# Patient Record
Sex: MALE | Race: WHITE | URBAN - METROPOLITAN AREA
[De-identification: names, ages, dates, MRNs, and addresses within clinical notes are randomized per-mention and may not be internally consistent; named-entity substitution may affect disease eponyms.]

---

## 2017-04-14 ENCOUNTER — TRANSFERRED RECORDS (OUTPATIENT)
Dept: HEALTH INFORMATION MANAGEMENT | Facility: CLINIC | Age: 32
End: 2017-04-14

## 2017-11-13 ENCOUNTER — OFFICE VISIT (OUTPATIENT)
Dept: FAMILY MEDICINE | Facility: CLINIC | Age: 32
End: 2017-11-13

## 2017-11-13 VITALS
TEMPERATURE: 98.7 F | WEIGHT: 172.6 LBS | BODY MASS INDEX: 27.09 KG/M2 | HEIGHT: 67 IN | SYSTOLIC BLOOD PRESSURE: 120 MMHG | HEART RATE: 72 BPM | DIASTOLIC BLOOD PRESSURE: 70 MMHG

## 2017-11-13 DIAGNOSIS — G47.9 SLEEP DISTURBANCE: Primary | ICD-10-CM

## 2017-11-13 DIAGNOSIS — F32.A DEPRESSION, UNSPECIFIED DEPRESSION TYPE: ICD-10-CM

## 2017-11-13 DIAGNOSIS — F69 BEHAVIOR CONCERN IN ADULT: ICD-10-CM

## 2017-11-13 LAB
ALBUMIN SERPL-MCNC: 4.4 G/DL (ref 3.4–5)
ALP SERPL-CCNC: 85 U/L (ref 40–150)
ALT SERPL W P-5'-P-CCNC: 120 U/L (ref 0–70)
ANION GAP SERPL CALCULATED.3IONS-SCNC: 9 MMOL/L (ref 3–14)
AST SERPL W P-5'-P-CCNC: 219 U/L (ref 0–45)
BASOPHILS # BLD AUTO: 0 10E9/L (ref 0–0.2)
BASOPHILS NFR BLD AUTO: 0.3 %
BILIRUB SERPL-MCNC: 0.3 MG/DL (ref 0.2–1.3)
BUN SERPL-MCNC: 15 MG/DL (ref 7–30)
CALCIUM SERPL-MCNC: 9.3 MG/DL (ref 8.5–10.1)
CHLORIDE SERPL-SCNC: 101 MMOL/L (ref 94–109)
CO2 SERPL-SCNC: 25 MMOL/L (ref 20–32)
CREAT SERPL-MCNC: 0.88 MG/DL (ref 0.66–1.25)
DIFFERENTIAL METHOD BLD: NORMAL
EOSINOPHIL # BLD AUTO: 0.3 10E9/L (ref 0–0.7)
EOSINOPHIL NFR BLD AUTO: 3.5 %
ERYTHROCYTE [DISTWIDTH] IN BLOOD BY AUTOMATED COUNT: 12.9 % (ref 10–15)
FOLATE SERPL-MCNC: 25.9 NG/ML
GFR SERPL CREATININE-BSD FRML MDRD: >90 ML/MIN/1.7M2
GLUCOSE SERPL-MCNC: 97 MG/DL (ref 70–99)
HCT VFR BLD AUTO: 49.7 % (ref 40–53)
HGB BLD-MCNC: 17.2 G/DL (ref 13.3–17.7)
LYMPHOCYTES # BLD AUTO: 2.1 10E9/L (ref 0.8–5.3)
LYMPHOCYTES NFR BLD AUTO: 23.4 %
MCH RBC QN AUTO: 29.7 PG (ref 26.5–33)
MCHC RBC AUTO-ENTMCNC: 34.6 G/DL (ref 31.5–36.5)
MCV RBC AUTO: 86 FL (ref 78–100)
MONOCYTES # BLD AUTO: 0.8 10E9/L (ref 0–1.3)
MONOCYTES NFR BLD AUTO: 8.3 %
NEUTROPHILS # BLD AUTO: 5.9 10E9/L (ref 1.6–8.3)
NEUTROPHILS NFR BLD AUTO: 64.5 %
PLATELET # BLD AUTO: 241 10E9/L (ref 150–450)
POTASSIUM SERPL-SCNC: 4.5 MMOL/L (ref 3.4–5.3)
PROT SERPL-MCNC: 7.9 G/DL (ref 6.8–8.8)
RBC # BLD AUTO: 5.8 10E12/L (ref 4.4–5.9)
SODIUM SERPL-SCNC: 135 MMOL/L (ref 133–144)
TSH SERPL DL<=0.005 MIU/L-ACNC: 2.29 MU/L (ref 0.4–4)
VIT B12 SERPL-MCNC: 474 PG/ML (ref 193–986)
WBC # BLD AUTO: 9.1 10E9/L (ref 4–11)

## 2017-11-13 PROCEDURE — 99000 SPECIMEN HANDLING OFFICE-LAB: CPT | Performed by: NURSE PRACTITIONER

## 2017-11-13 PROCEDURE — 86800 THYROGLOBULIN ANTIBODY: CPT | Mod: 90 | Performed by: NURSE PRACTITIONER

## 2017-11-13 PROCEDURE — 84630 ASSAY OF ZINC: CPT | Mod: 90 | Performed by: NURSE PRACTITIONER

## 2017-11-13 PROCEDURE — 36415 COLL VENOUS BLD VENIPUNCTURE: CPT | Performed by: NURSE PRACTITIONER

## 2017-11-13 PROCEDURE — 82607 VITAMIN B-12: CPT | Performed by: NURSE PRACTITIONER

## 2017-11-13 PROCEDURE — 99203 OFFICE O/P NEW LOW 30 MIN: CPT | Performed by: NURSE PRACTITIONER

## 2017-11-13 PROCEDURE — 82306 VITAMIN D 25 HYDROXY: CPT | Performed by: NURSE PRACTITIONER

## 2017-11-13 PROCEDURE — 80050 GENERAL HEALTH PANEL: CPT | Performed by: NURSE PRACTITIONER

## 2017-11-13 PROCEDURE — 82746 ASSAY OF FOLIC ACID SERUM: CPT | Performed by: NURSE PRACTITIONER

## 2017-11-13 PROCEDURE — 84432 ASSAY OF THYROGLOBULIN: CPT | Mod: 90 | Performed by: NURSE PRACTITIONER

## 2017-11-13 RX ORDER — TRAZODONE HYDROCHLORIDE 50 MG/1
50 TABLET, FILM COATED ORAL
Qty: 30 TABLET | Refills: 1 | Status: SHIPPED | OUTPATIENT
Start: 2017-11-13

## 2017-11-13 ASSESSMENT — ENCOUNTER SYMPTOMS
WHEEZING: 0
NAUSEA: 0
SHORTNESS OF BREATH: 0
DYSPHORIC MOOD: 1
HEADACHES: 0
FEVER: 0
RHINORRHEA: 0
FATIGUE: 0
PALPITATIONS: 1
DIAPHORESIS: 1
COUGH: 0
VOMITING: 0
SINUS PRESSURE: 0
DIARRHEA: 0
NERVOUS/ANXIOUS: 1
EYE DISCHARGE: 0
SORE THROAT: 0
SLEEP DISTURBANCE: 1
HALLUCINATIONS: 1

## 2017-11-13 ASSESSMENT — ANXIETY QUESTIONNAIRES
7. FEELING AFRAID AS IF SOMETHING AWFUL MIGHT HAPPEN: MORE THAN HALF THE DAYS
1. FEELING NERVOUS, ANXIOUS, OR ON EDGE: NEARLY EVERY DAY
3. WORRYING TOO MUCH ABOUT DIFFERENT THINGS: NEARLY EVERY DAY
2. NOT BEING ABLE TO STOP OR CONTROL WORRYING: NEARLY EVERY DAY
5. BEING SO RESTLESS THAT IT IS HARD TO SIT STILL: MORE THAN HALF THE DAYS
GAD7 TOTAL SCORE: 18
6. BECOMING EASILY ANNOYED OR IRRITABLE: MORE THAN HALF THE DAYS

## 2017-11-13 ASSESSMENT — PATIENT HEALTH QUESTIONNAIRE - PHQ9
5. POOR APPETITE OR OVEREATING: NEARLY EVERY DAY
SUM OF ALL RESPONSES TO PHQ QUESTIONS 1-9: 22

## 2017-11-13 NOTE — NURSING NOTE
"Chief Complaint   Patient presents with     Mental Health Problem       Initial /70 (BP Location: Left arm, Patient Position: Sitting, Cuff Size: Adult Large)  Pulse 72  Temp 98.7  F (37.1  C) (Tympanic)  Ht 5' 7\" (1.702 m)  Wt 172 lb 9.6 oz (78.3 kg)  BMI 27.03 kg/m2 Estimated body mass index is 27.03 kg/(m^2) as calculated from the following:    Height as of this encounter: 5' 7\" (1.702 m).    Weight as of this encounter: 172 lb 9.6 oz (78.3 kg).  Medication Reconciliation: complete     April COLTON William      "

## 2017-11-13 NOTE — MR AVS SNAPSHOT
After Visit Summary   11/13/2017    Kenny Jackson    MRN: [id number]           Patient Information     Date Of Birth          1985        Visit Information        Provider Department      11/13/2017 11:00 AM Cadence Varma APRN CNP [facility]        Today's Diagnoses     Sleep disturbance    -  1    Behavior concern in adult        Depression, unspecified depression type           Follow-ups after your visit        Additional Services     CARE COORDINATION REFERRAL       Services are provided by a Care Coordinator for people with complex needs such as: medical, social, or financial troubles.  The Care Coordinator works with the patient and their Primary Care Provider to determine health goals, obtain resources, achieve outcomes, and develop care plans that help coordinate the patient's care.     Reason for Referral: Chemical Dependence: Patient reluctant to discuss the dependence, Mental Status/Behavioral Changes and Other:     Provide additional details for Care Coordination to best meet the patient's current needs: Concern about if has health insurance or not     Clinical Staff have discussed the Care Coordination Referral with the patient and/or caregiver: yes            MENTAL HEALTH REFERRAL       Your provider has referred you to: G: [facility] - Counseling (Individual/Couples/Family) - [facility] [phone]   [email or web address]/   *Patient will be contacted by [facility] scheduling partner, [facility]), to schedule an appointment.  Patients may also call [facility] to schedule.    All scheduling is subject to the client's specific insurance plan & benefits, provider/location availability, and provider clinical specialities.  Please arrive 15 minutes early for your first appointment and bring your completed paperwork.    Please be aware that coverage of these  services is subject to the terms and limitations of your health insurance plan.  Call member services at your health plan with any benefit or coverage questions.            MENTAL HEALTH REFERRAL       Your provider has referred you to: [facility]: [facility]  [phone]   [email or web address]/   *Referral from [facility] Primary Care Provider required - Consultation Model - medication management & future refills will be returned to [facility] PCP upon completion of evaluation  *Please call to schedule an appointment.    All scheduling is subject to the client's specific insurance plan & benefits, provider/location availability, and provider clinical specialities.  Please arrive 15 minutes early for your first appointment and bring your completed paperwork.    Please be aware that coverage of these services is subject to the terms and limitations of your health insurance plan.  Call member services at your health plan with any benefit or coverage questions.                  Who to contact     Normal or non-critical lab and imaging results will be communicated to you by [facility] In[facility], letter or phone within 4 business days after the clinic has received the results. If you do not hear from us within 7 days, please contact the clinic through [facility] Inchart or phone. If you have a critical or abnormal lab result, we will notify you by phone as soon as possible.  Submit refill requests through [employer] or call your pharmacy and they will forward the refill request to us. Please allow 3 business days for your refill to be completed.          If you need to speak with a  for additional information , please call: [phone]           Additional Information About Your Visit        [employer] Information     [employer] lets you send messages to your doctor, view your test results, renew your prescriptions, schedule appointments and  "more. To sign up, go to [email or web address] . Click on \"Log in\" on the left side of the screen, which will take you to the Welcome page. Then click on \"Sign up Now\" on the right side of the page.     You will be asked to enter the access code listed below, as well as some personal information. Please follow the directions to create your username and password.     Your access code is: [email or web address]  Expires: 2018 11:48 AM     Your access code will  in 90 days. If you need help or a new code, please call your [facility] clinic or [phone].        Care EveryWhere ID     This is your Care EveryWhere ID. This could be used by other organizations to access your [facility] medical records  [id number]        Your Vitals Were     Pulse Temperature Height BMI (Body Mass Index)          72 98.7  F (37.1  C) (Tympanic) 5' 7\" (1.702 m) 27.03 kg/m2         Blood Pressure from Last 3 Encounters:   17 120/70    Weight from Last 3 Encounters:   17 172 lb 9.6 oz (78.3 kg)              We Performed the Following     CARE COORDINATION REFERRAL     CBC with platelets differential     Comprehensive metabolic panel     Folate     MENTAL HEALTH REFERRAL     MENTAL HEALTH REFERRAL     Thyroglobulin and antibody     TSH with free T4 reflex     Vitamin B12     Vitamin D Deficiency     Zinc          Today's Medication Changes          These changes are accurate as of: 17 11:48 AM.  If you have any questions, ask your nurse or doctor.               Start taking these medicines.        Dose/Directions    traZODone 50 MG tablet   Commonly known as:  DESYREL   Used for:  Sleep disturbance   Started by:  Cadence Varma APRN CNP        Dose:  50 mg   Take 1 tablet (50 mg) by mouth nightly as needed for sleep   Quantity:  30 tablet   Refills:  1            Where to get your medicines      These medications were sent to [facility] [address] AT [facility] " [address] [facility] [address]     Phone:  [phone]     traZODone 50 MG tablet                Primary Care Provider Office Phone # Fax #    [facility] [phone] [address] [facility] [address]        Equal Access to Services     ANA LUISA MAGDALENO : Hadii aad ku hadasho Soomaali, waaxda luqadaha, qaybta kaalmada adeegyada, waxay idiin hayaan adeeg kharamary lasofia wade. So [facility] [phone].    ATENCIÓN: Si habla español, tiene a robledo disposición servicios gratuitos de asistencia lingüística. dAonis al [phone].    We comply with applicable federal civil rights laws and [place] laws. We do not discriminate on the basis of race, color, national origin, age, disability, sex, sexual orientation, or gender identity.            Thank you!     Thank you for choosing [facility]  for your care. Our goal is always to provide you with excellent care. Hearing back from our patients is one way we can continue to improve our services. Please take a few minutes to complete the written survey that you may receive in the mail after your visit with us. Thank you!             Your Updated Medication List - Protect others around you: Learn how to safely use, store and throw away your medicines at www.disposemymeds.org.          This list is accurate as of: 11/13/17 11:48 AM.  Always use your most recent med list.                   Brand Name Dispense Instructions for use Diagnosis    traZODone 50 MG tablet    DESYREL    30 tablet    Take 1 tablet (50 mg) by mouth nightly as needed for sleep    Sleep disturbance

## 2017-11-13 NOTE — PROGRESS NOTES
"  SUBJECTIVE:   Kenny Jackson is a 32 year old male who presents to clinic today for the following health issues:      Abnormal Mood Symptoms    Onset: 18 years , history of schizophrenia as a teen-took Zyrpexa and Trazodone for awhile but stopped because it made him feel like a \"zombie\"     Description:   Depression: YES-also concerned about bipolar   Anxiety: YES    Accompanying Signs & Symptoms: possible PTSD from unknown circumstance, will swing at someone if they come around the corner at same time  Still participating in activities that you used to enjoy: somewhat  Fatigue: YES-because he has a hard time sleeping  Irritability: YES  Difficulty concentrating: YES  Changes in appetite: YES-sometimes will over eat, other times he doesn't want to eat  Problems with sleep: YES-mind will race  Heart racing/beating fast : YES- sometimes during the day  Thoughts of hurting yourself or others: Presently YES- thoughts of suicide. Tried to cut his throat 3 years ago.  Went to ER in [place].      History:   Recent stress: YES- long term relationship ended and she is withholding the kids from him.  Was able to see his kids last week.   Prior depression hospitalization: None  Family history of depression: YES  Family history of anxiety: YES    Precipitating factors:   Alcohol/drug use: YES- some days will drink an entire bottle of alcohol, used marijuana, mushrooms and meth in the past    Alleviating factors:  none    Therapies Tried and outcome: Zyrpexa and Trazodone made him feel like a \"zombie\". Went to psychologist 4/14/17.  Has records with him.  Advised to be evaluated for schizophrenia, PTSD, bipolar and conduct disorder.         Problem list and histories reviewed & adjusted, as indicated.  Additional history: as documented    Current Outpatient Prescriptions   Medication Sig Dispense Refill     traZODone (DESYREL) 50 MG tablet Take 1 tablet (50 mg) by mouth nightly as needed for sleep 30 tablet 1     No Known " "Allergies    Reviewed and updated as needed this visit by clinical staffTobacco  Allergies  Meds  Med Hx  Surg Hx  Fam Hx  Soc Hx      Reviewed and updated as needed this visit by Provider           Has only been on zp    Has been having thoughts of harming slef. Kids and sister keep from doing that. Is not able to fall     Alcholol uses daily up to 6 pk. Does hear voices   Alcohol will make depressed   No recent drug use.   Feels heart race at times  Will get sweaty a lot    Has seen theapist in the past.     At appointment today with sister Marivel, whom Kenny lives with. Has history of schizophrenia. Has only every taking Zyprexa. Did not like the way that made him feel so stopped taking it. At that time did not have medical insurance so did not follow up. Does hear voices at times and sees things that knows really are not there. Has thoughts of harming self. Last night was last time thought of harming self. Has pictured doing it many different ways but thinks of kids and sister keep from following through with it. Is not sleeping well. Has a hard time falling asleep and staying asleep, has taken trazodone in the past and it worked well. Tolerated well. Drinks alcohol daily, up to 6 pk per day. Drinks to quite voices and help with sleep. Thinks makes it better, sister reports makes depression worse and that is when has thoughts of self harm is when has been drinking. No recent illicit drug use, last time that used was over 10 years ago. Has been to treatment in the past for alcohol, does not currently want treatment. Had \"rough childhood\" Has been to therapy when was a child. Did go as an adult as well. Did feel like it helped. Does get anxious at times and heart will feel like it is going fast and will get sweaty. No shortness of breath, dizziness or pain. Has been diagnosed with ADD in the past. Wondering if is bipolar or has PTSD.      ROS:  Review of Systems   Constitutional: Positive for diaphoresis " "(with anxiety ). Negative for fatigue and fever.   HENT: Negative for congestion, ear pain, rhinorrhea, sinus pressure and sore throat.    Eyes: Negative for discharge.   Respiratory: Negative for cough, shortness of breath and wheezing.    Cardiovascular: Positive for palpitations (with anxiety ). Negative for chest pain.   Gastrointestinal: Negative for diarrhea, nausea and vomiting.   Neurological: Negative for headaches.   Psychiatric/Behavioral: Positive for dysphoric mood, hallucinations, sleep disturbance and suicidal ideas (yesterday). Negative for self-injury. The patient is nervous/anxious.          OBJECTIVE:     /70 (BP Location: Left arm, Patient Position: Sitting, Cuff Size: Adult Large)  Pulse 72  Temp 98.7  F (37.1  C) (Tympanic)  Ht 5' 7\" (1.702 m)  Wt 172 lb 9.6 oz (78.3 kg)  BMI 27.03 kg/m2  Body mass index is 27.03 kg/(m^2).  Physical Exam    No PE completed, visit was 100% discussion     ASSESSMENT/PLAN:   1. Sleep disturbance  Educated on use   - traZODone (DESYREL) 50 MG tablet; Take 1 tablet (50 mg) by mouth nightly as needed for sleep  Dispense: 30 tablet; Refill: 1    2. Behavior concern in adult  Will refer  To ER if has return of suicidal ideation   - CARE COORDINATION REFERRAL  - MENTAL HEALTH REFERRAL  - MENTAL HEALTH REFERRAL  - CBC with platelets differential  - Comprehensive metabolic panel  - Folate  - Thyroglobulin and antibody  - Zinc  - Vitamin D Deficiency  - Vitamin B12  - TSH with free T4 reflex    3. Depression, unspecified depression type  Will refer  To ER if has return of suicidal ideation   - CBC with platelets differential  - Comprehensive metabolic panel  - Folate  - Thyroglobulin and antibody  - Zinc  - Vitamin D Deficiency  - Vitamin B12  - TSH with free T4 reflex    During this visit greater than 100% of the 33 minutes for this appointment was spent in counseling and/or coordinating care of above stated issues.     Cadence H. [place], APRN CNP  [facility] " [facility]

## 2017-11-13 NOTE — LETTER
November 15, 2017      Kenny Washington  [address] [facility] [address]        Dear ,    Your Vit D level is on the very low end of normal. You need to start taking Vitamin D3 3000 units daily and plan to recheck your Vitamin D level again in 3 months.     Your liver function is elevated, likely from alcohol use. I would recommend to stop drinking alcohol and avoid all Tylenol products. I would also recommend getting set up for an ultrasound of your liver. This would be done in [place]. I have entered the order. You can call [phone] to set up the appointment. Should plan to recheck your liver function tests, ast and alt in 1 month. Please make a lab only appointment to have this done.     Your blood counts are all in normal range   Your electrolytes are all in normal range   Your kidney function is normal   Your thyroid is in normal range   Your glucose (blood sugar) is in normal range    If you have any questions or concerns, please call the clinic at the number listed above.       Sincerely,        Cadence Varma, URSULA CNP/EC CMA

## 2017-11-13 NOTE — LETTER
November 24, 2017      Kenny Washington  [address] [facility] [address]        Dear ,    We are writing to inform you of your test results.    Your thyroid function is normal.      Resulted Orders   CBC with platelets differential   Result Value Ref Range    WBC 9.1 4.0 - 11.0 10e9/L    RBC Count 5.80 4.4 - 5.9 10e12/L    Hemoglobin 17.2 13.3 - 17.7 g/dL    Hematocrit 49.7 40.0 - 53.0 %    MCV 86 78 - 100 fl    MCH 29.7 26.5 - 33.0 pg    MCHC 34.6 31.5 - 36.5 g/dL    RDW 12.9 10.0 - 15.0 %    Platelet Count 241 150 - 450 10e9/L    Diff Method Automated Method     % Neutrophils 64.5 %    % Lymphocytes 23.4 %    % Monocytes 8.3 %    % Eosinophils 3.5 %    % Basophils 0.3 %    Absolute Neutrophil 5.9 1.6 - 8.3 10e9/L    Absolute Lymphocytes 2.1 0.8 - 5.3 10e9/L    Absolute Monocytes 0.8 0.0 - 1.3 10e9/L    Absolute Eosinophils 0.3 0.0 - 0.7 10e9/L    Absolute Basophils 0.0 0.0 - 0.2 10e9/L   Comprehensive metabolic panel   Result Value Ref Range    Sodium 135 133 - 144 mmol/L    Potassium 4.5 3.4 - 5.3 mmol/L    Chloride 101 94 - 109 mmol/L    Carbon Dioxide 25 20 - 32 mmol/L    Anion Gap 9 3 - 14 mmol/L    Glucose 97 70 - 99 mg/dL    Urea Nitrogen 15 7 - 30 mg/dL    Creatinine 0.88 0.66 - 1.25 mg/dL    GFR Estimate >90 >60 mL/min/1.7m2      Comment:      Non  GFR Calc    GFR Estimate If Black >90 >60 mL/min/1.7m2      Comment:       GFR Calc    Calcium 9.3 8.5 - 10.1 mg/dL    Bilirubin Total 0.3 0.2 - 1.3 mg/dL    Albumin 4.4 3.4 - 5.0 g/dL    Protein Total 7.9 6.8 - 8.8 g/dL    Alkaline Phosphatase 85 40 - 150 U/L     (H) 0 - 70 U/L     (H) 0 - 45 U/L   Folate   Result Value Ref Range    Folate 25.9 >5.4 ng/mL   Thyroglobulin and antibody   Result Value Ref Range    Lab Scanned Result THYROG-Scanned    Zinc   Result Value Ref Range    Zinc 73 60 - 120 ug/dL      Comment:      (Note)  INTERPRETIVE INFORMATION: Zinc, Serum or Plasma  Circulating  zinc concentrations are dependent on albumin   status and are depressed with malnutrition. Zinc may also   be lowered with infection, inflammation, stress, oral   contraceptives, and pregnancy. Zinc may be elevated with   zinc supplementation or fasting. Elevated zinc   concentrations may interfere with copper absorption.  Test developed and characteristics determined by [facility]. See Compliance Statement B: [employer]/CS  Performed by [facility],  [address] [facility] [facility] [address] [phone]  [email or web address], Jonah Ro MD, Lab. Director     Vitamin D Deficiency   Result Value Ref Range    Vitamin D Deficiency screening 29 20 - 75 ug/L      Comment:      Season, race, dietary intake, and treatment affect the concentration of   25-hydroxy-Vitamin D. Values may decrease during winter months and increase   during summer months. Values 20-29 ug/L may indicate Vitamin D insufficiency   and values <20 ug/L may indicate Vitamin D deficiency.  Vitamin D determination is routinely performed by an immunoassay specific for   25 hydroxyvitamin D3.  If an individual is on vitamin D2 (ergocalciferol)   supplementation, please specify 25 OH vitamin D2 and D3 level determination by   LCMSMS test VITD23.     Vitamin B12   Result Value Ref Range    Vitamin B12 474 193 - 986 pg/mL   TSH with free T4 reflex   Result Value Ref Range    TSH 2.29 0.40 - 4.00 mU/L       If you have any questions or concerns, please call the clinic at the number listed above.       Sincerely,        URSULA Morataya CNP/[facility] R Atkins, [facility]

## 2017-11-14 LAB — DEPRECATED CALCIDIOL+CALCIFEROL SERPL-MC: 29 UG/L (ref 20–75)

## 2017-11-14 ASSESSMENT — ANXIETY QUESTIONNAIRES: GAD7 TOTAL SCORE: 18

## 2017-11-15 ENCOUNTER — CARE COORDINATION (OUTPATIENT)
Dept: CARE COORDINATION | Facility: CLINIC | Age: 32
End: 2017-11-15

## 2017-11-15 DIAGNOSIS — R79.89 ELEVATED LIVER FUNCTION TESTS: Primary | ICD-10-CM

## 2017-11-15 NOTE — LETTER
Health Care Home - Access Care Plan    About Me  Patient Name:  Kenny [facility]    YOB: 1985  Age:                            32 year old   Miguel MRN:         [id number] Telephone Information:   Home Phone [phone]   Mobile [phone]       Address:    [address] [place] [address] Email address:  No e-mail address on record      Emergency Contact(s)  Name Relationship Lgl Grd Work Phone Home Phone Mobile Phone   1. LUISITO GLYNN Father   [phone]    2. EMBER MUÑOZ Sister   [phone]        My Access Plan  Medical Emergency 911   Questions or concerns during clinic hours Primary Clinic Line, I will call the clinic directly: Primary Clinic: [facility] [phone]   24 Hour Appointment Line [phone] or  [phone] [phone])  (toll free)   24 Hour Nurse Line [phone] (toll free)   Questions or concerns outside clinic hours 24 Hour Appointment Line, I will call the after-hours on-call line: [facility] [phone] or [phone]) (toll-free)   Preferred Urgent Care Unknown    Preferred Hospital Unknown   Preferred Pharmacy [facility] [id number] AT [facility]     Behavioral Health Crisis Line The National Suicide Prevention Lifeline at 1-353.263.9479 or 910     My Care Team Members  Patient Care Team       Relationship Specialty Notifications Start End    Clinic, [facility] PCP - General   11/9/17     Comment:  Merged    Phone: [phone] Fax: [phone] [address] [facility] [address]    Roger Conrad MD     11/9/17     Comment:  Merged    Phone: [phone] Fax: [phone] [address] [facility] [address]    Caty Jo   Admissions 11/15/17     Phone: [phone] Fax: [phone]            My Medical and Care Information  Problem List   There is no problem list  on file for this patient.     Current Medications and Allergies:  See printed Medication Report

## 2017-11-15 NOTE — PROGRESS NOTES
Kenny,     Your Vit D level is on the very low end of normal. You need to start taking Vitamin D3 3000 units daily and plan to recheck your Vitamin D level again in 3 months.    Your liver function is elevated, likely from alcohol use. I would recommend to stop drinking alcohol and avoid all Tylenol products. I would also recommend getting set up for an ultrasound of your liver. This would be done in [place]. I have entered the order. You can call [phone] to set up the appointment. Should plan to recheck your liver function tests, ast and alt in 1 month. Please make a lab only appointment to have this done.     Your blood counts are all in normal range  Your electrolytes are all in normal range  Your kidney function is normal  Your thyroid is in normal range  Your glucose (blood sugar) is in normal range    Please call or email with any additional questions or concerns  URSULA Hernandez CNP

## 2017-11-15 NOTE — LETTER
[facility] CARE COORDINATION  [address] [facility] [address]  Phone: [phone]      November 15, 2017      Kenny Washington  [address] [place] [address]      Dear Kenny,  I am the Clinic Care Coordinator that works with your providers at the [facility]. I wanted to thank you for spending the time to talk with me today on the phone; sorry I caught you at work.  Below is a description of what Clinic Care Coordination is and how I can further assist you.     The Clinic Care Coordinator role is a Registered Nurse and/or  who understands the health care system. The goal of Clinic Care Coordination is to help you manage your health and improve access to the [facility] system in the most efficient manner.  The Registered Nurse can assist you in meeting your health care goals by providing education, coordinating services, and strengthening the communication among your providers. The  can assist you with financial, behavioral, psychosocial, and chemical dependency and counseling/psychiatric resources.    Please contact me at [phone] with any questions or concerns that may arise. We at [facility] are focused on providing you with the highest-quality healthcare experience possible and that all starts with you.       Sincerely,     Caty Jo    Enclosed: I have enclosed a copy of a 24 Hour Access Plan. This has helpful phone numbers for you to call when needed. Please keep this in an easy to access place to use as needed.

## 2017-11-15 NOTE — PROGRESS NOTES
Clinic Care Coordination Contact  OUTREACH    Referral Information:  Referral Source: PCP  Reason for Contact: Pt with no insurance, reluctant to discuss chemical abuse (ETOH) per provider.  Pt does not have a [place] PCP; only seen at the [facility] once.  Care Conference: No     Universal Utilization:   ED Visits in last year: 0  Hospital visits in last year: 0  Last PCP appointment: 11/15/17     Clinical Concerns:  Current Medical Concerns:  Alcohol abuse  Current Behavioral Concerns: mental health issues, possibly schizophrenic, bipolar, anxiety.  Has not been taking medications or seeing therapist, per provider note.    Education Provided to patient: SW introduced self, title and role to the pt, but he was at work and not able to talk; asked to call this writer back.     Clinical Pathway: None    Medication Management:  Just started Trazadone by provider on 11-13-17    Functional Status:  Mobility Status: Independent  Transportation: Pt has transportation.      Psychosocial:  Current living arrangement:: Not Assessed  Financial/Insurance: Pt is working for wages as a , 's assistant/ Lacks insurance.  SARA did educate pt on how to apply for Medical Assistance with [facility] [employer].       Resources and Interventions:  Current Resources: family      Referrals Placed: [facility] Resources     Goals: NA  Barriers: Pt was working so unable to discuss much with him today.  Will await return call or call him back in 5-7 business days  Strengths: Pt has supportive sister  Patient/Caregiver understanding: NA      Plan: SARA to continue to reach out to pt for resources.    Caty Arellano  Social Work Care Coordinator  [facility]  [phone]

## 2017-11-16 LAB — ZINC SERPL-MCNC: 73 UG/DL (ref 60–120)

## 2017-11-21 LAB — LAB SCANNED RESULT: NORMAL

## 2017-12-06 PROBLEM — F32.A DEPRESSION, UNSPECIFIED DEPRESSION TYPE: Status: ACTIVE | Noted: 2017-12-06

## 2017-12-18 ENCOUNTER — TELEPHONE (OUTPATIENT)
Dept: FAMILY MEDICINE | Facility: CLINIC | Age: 32
End: 2017-12-18

## 2017-12-18 DIAGNOSIS — F32.A DEPRESSION, UNSPECIFIED DEPRESSION TYPE: Primary | ICD-10-CM

## 2017-12-18 NOTE — LETTER
My Depression Action Plan  Name: Kenny Washington   Date of Birth 1985  Date: 12/18/2017    My doctor: Sonny Garcia   My clinic: [facility] [address] [id number] [phone]          GREEN    ZONE   Good Control    What it looks like:     Things are going generally well. You have normal up s and down s. You may even feel depressed from time to time, but bad moods usually last less than a day.   What you need to do:  1. Continue to care for yourself (see self care plan)  2. Check your depression survival kit and update it as needed  3. Follow your physician s recommendations including any medication.  4. Do not stop taking medication unless you consult with your physician first.           YELLOW         ZONE Getting Worse    What it looks like:     Depression is starting to interfere with your life.     It may be hard to get out of bed; you may be starting to isolate yourself from others.    Symptoms of depression are starting to last most all day and this has happened for several days.     You may have suicidal thoughts but they are not constant.   What you need to do:     1. Call your care team, your response to treatment will improve if you keep your care team informed of your progress. Yellow periods are signs an adjustment may need to be made.     2. Continue your self-care, even if you have to fake it!    3. Talk to someone in your support network    4. Open up your depression survival kit           RED    ZONE Medical Alert - Get Help    What it looks like:     Depression is seriously interfering with your life.     You may experience these or other symptoms: You can t get out of bed most days, can t work or engage in other necessary activities, you have trouble taking care of basic hygiene, or basic responsibilities, thoughts of suicide or death that will not go away, self-injurious behavior.     What you need to do:  1. Call your care  team and request a same-day appointment. If they are not available (weekends or after hours) call your local crisis line, emergency room or 911.          Depression Self Care Plan / Survival Kit    Self-Care for Depression  Here s the deal. Your body and mind are really not as separate as most people think.  What you do and think affects how you feel and how you feel influences what you do and think. This means if you do things that people who feel good do, it will help you feel better.  Sometimes this is all it takes.  There is also a place for medication and therapy depending on how severe your depression is, so be sure to consult with your medical provider and/ or Behavioral Health Consultant if your symptoms are worsening or not improving.     In order to better manage my stress, I will:    Exercise  Get some form of exercise, every day. This will help reduce pain and release endorphins, the  feel good  chemicals in your brain. This is almost as good as taking antidepressants!  This is not the same as joining a gym and then never going! (they count on that by the way ) It can be as simple as just going for a walk or doing some gardening, anything that will get you moving.      Hygiene   Maintain good hygiene (Get out of bed in the morning, Make your bed, Brush your teeth, Take a shower, and Get dressed like you were going to work, even if you are unemployed).  If your clothes don't fit try to get ones that do.    Diet  I will strive to eat foods that are good for me, drink plenty of water, and avoid excessive sugar, caffeine, alcohol, and other mood-altering substances.  Some foods that are helpful in depression are: complex carbohydrates, B vitamins, flaxseed, fish or fish oil, fresh fruits and vegetables.    Psychotherapy  I agree to participate in Individual Therapy (if recommended).    Medication  If prescribed medications, I agree to take them.  Missing doses can result in serious side effects.  I  understand that drinking alcohol, or other illicit drug use, may cause potential side effects.  I will not stop my medication abruptly without first discussing it with my provider.    Staying Connected With Others  I will stay in touch with my friends, family members, and my primary care provider/team.    Use your imagination  Be creative.  We all have a creative side; it doesn t matter if it s oil painting, sand castles, or mud pies! This will also kick up the endorphins.    Witness Beauty  (AKA stop and smell the roses) Take a look outside, even in mid-winter. Notice colors, textures. Watch the squirrels and birds.     Service to others  Be of service to others.  There is always someone else in need.  By helping others we can  get out of ourselves  and remember the really important things.  This also provides opportunities for practicing all the other parts of the program.    Humor  Laugh and be silly!  Adjust your TV habits for less news and crime-drama and more comedy.    Control your stress  Try breathing deep, massage therapy, biofeedback, and meditation. Find time to relax each day.     My support system    Clinic Contact:  Phone number:    Contact 1:  Phone number:    Contact 2:  Phone number:    Christian/:  Phone number:    Therapist:  Phone number:    Local crisis center:    Phone number:    Other community support:  Phone number:

## 2017-12-18 NOTE — TELEPHONE ENCOUNTER
Panel Management Review      Patient has the following on his problem list:     Depression / Dysthymia review    Measure:  Needs PHQ-9 score of 4 or less during index window.  Administer PHQ-9 and if score is 5 or more, send encounter to provider for next steps.    5 - 7 month window range: 4/13/18-6/13/18    PHQ-9 SCORE 11/13/2017   Total Score 22       If PHQ-9 recheck is 5 or more, route to provider for next steps.    Patient is due for:  DAP        Composite cancer screening  Chart review shows that this patient is due/due soon for the following None  Summary:    Patient is due/failing the following:   DAP    Action needed:   Patient needs referral/order: DAP    Type of outreach:    Sent letter.    Questions for provider review:    None                                                                                                                                    April COLTON William       Chart routed to none .

## 2017-12-18 NOTE — LETTER
[facility]  [address] [place] [id number]  [phone]      December 18, 2017      Kenny Washington  [address] [place] [id number]        Dear Kenny,       Please keep the enclosed depression action plan for your records.     Thank you for choosing [facility] for your healthcare needs.    Sincerely,     [employer]

## 2018-04-13 ENCOUNTER — TELEPHONE (OUTPATIENT)
Dept: FAMILY MEDICINE | Facility: CLINIC | Age: 33
End: 2018-04-13

## 2018-04-13 NOTE — LETTER
April 13, 2018      Kenny Washington  [address] [address]        Dear Kenny,       Please fill out the enclosed depression questionnaire. These questions are about your depression and how you have been feeling in the last 2 weeks. We need this form updated in your chart in order to continue to fill your medication. The score of this questionnaire helps to determine if your medications are working well. Thank you in advance for filling it out. There is a stamped envelope, addressed to Miguel provided.  Please mail back the depression questionnaire as soon as you are able.      Thank you for choosing [facility] for your healthcare needs.    Sincerely,     Miguel Sotut

## 2018-04-13 NOTE — TELEPHONE ENCOUNTER
Panel Management Review      Patient has the following on his problem list:     Depression / Dysthymia review    Measure:  Needs PHQ-9 score of 4 or less during index window.  Administer PHQ-9 and if score is 5 or more, send encounter to provider for next steps.    5 - 7 month window range: 4/13/18-6/13/18    PHQ-9 SCORE 11/13/2017   Total Score 22       If PHQ-9 recheck is 5 or more, route to provider for next steps.    Patient is due for:  PHQ9      Composite cancer screening  Chart review shows that this patient is due/due soon for the following None  Summary:    Patient is due/failing the following:   PHQ9    Action needed:   Patient needs to do PHQ9.    Type of outreach:    Sent letter.    Questions for provider review:    None                                                                                                                                    April COLTON William       Chart routed to none .

## 2018-07-10 ENCOUNTER — TELEPHONE (OUTPATIENT)
Dept: FAMILY MEDICINE | Facility: CLINIC | Age: 33
End: 2018-07-10

## 2018-07-10 NOTE — TELEPHONE ENCOUNTER
Panel Management Review      Patient has the following on his problem list:     Depression / Dysthymia review    Measure:  Needs PHQ-9 score of 4 or less during index window.  Administer PHQ-9 and if score is 5 or more, send encounter to provider for next steps.    4 - 8 month window range: 3/27/18-7/27/18    PHQ-9 SCORE 11/13/2017   Total Score 22       If PHQ-9 recheck is 5 or more, route to provider for next steps.    Patient is due for:  PHQ9      Composite cancer screening  Chart review shows that this patient is due/due soon for the following None  Summary:    Patient is due/failing the following:   HIV screen, and PHQ9    Action needed:   Patient needs to do PHQ9.    Type of outreach:    Sent letter.    Questions for provider review:    None                                                                                                                                    Varsha William CMA       Chart routed to none .

## 2018-07-10 NOTE — LETTER
July 10, 2018      Kenny Washington  [address] [facility] [address] [address] [address]          Dear Kenny,     As part of [facility]'s commitment to health and wellness, we periodically look at how well we are taking care of you when you're not sick. Along with your annual physical exams in our office, routine cancer screening is an important early detection tool that we can use together to keep you healthy for a long time.    Our most recent records indicate that you are due for one or more of the following:    -- Questionnaire for depression. Please fill out the attached questionnaire. These questions are about your depression and how you have been feeling in the last 2 weeks. We need this form updated in your chart in order to continue to fill your medication. The score of this questionnaire helps to determine if your medications are working well. Thank you in advance for filling it out. There is a stamped envelope addressed to [facility] provided.       While we work hard to maintain accurate records on all our patients, it is always possible that this notice does not accurately reflect a surgery you have had in the past to remove your colon, uterus (hysterectomy) or breast tissue (mastectomy). If we have made this error in your case please accept our apologies. To ensure that we do not continue to send you notices please verify at your next visit that we have accurate dates and locations of your surgical history, even if these were done many years ago.     Again, working together for your health is our goal. If you have any questions or concerns regarding this letter, we'd like to hear from you.    Thank you for choosing [facility] for your healthcare needs.    Sincerely,     [facility]

## 2019-05-15 ENCOUNTER — TELEPHONE (OUTPATIENT)
Dept: FAMILY MEDICINE | Facility: CLINIC | Age: 34
End: 2019-05-15

## 2019-05-15 NOTE — TELEPHONE ENCOUNTER
Panel Management Review      Patient has the following on his problem list:     Depression / Dysthymia review    Measure:  Needs PHQ-9 score of 4 or less during index window.  Administer PHQ-9 and if score is 5 or more, send encounter to provider for next steps.    5 - 7 month window range: 4/13/18-6/13/18    PHQ-9 SCORE 11/13/2017   PHQ-9 Total Score 22       If PHQ-9 recheck is 5 or more, route to provider for next steps.    Patient is due for:  PHQ9      Composite cancer screening  Chart review shows that this patient is due/due soon for the following None  Summary:    Patient is due/failing the following:   Health Maintenance Due   Topic Date Due     PREVENTIVE CARE VISIT  1985     HIV SCREEN (SYSTEM ASSIGNED)  09/06/2005     DTAP/TDAP/TD IMMUNIZATION (2 - Td) 02/17/2017     PHQ-9 Q6 MONTHS  05/13/2018         Action needed:   Patient needs office visit for Physical, complete PHQ9.    Type of outreach:    Attempted to contact patient. Home number listed is not in service. Will send letter.    Questions for provider review:    None                                                                                                                                    April COLTON William       Chart routed to none.

## 2019-05-15 NOTE — LETTER
May 15, 2019      Kenny Washington  [address] [facility] [facility] [address]      Dear Kenny Washington    We care about your health and have reviewed your health plan including your medical conditions, medication list, and lab results.  Based on this review, it is recommended that you follow up regarding the following health topic(s):     -Depression  -Wellness (Physical) Visit     We recommend you take the following action(s):  -schedule a WELLNESS (Physical) APPOINTMENT.  We will perform the following labs: (determine at appointment) At your physical we will also follow up on your depression.    Please call us at [phone] (or use APROOFED) to address the above recommendations.     Thank you for trusting [facility] and we appreciate the opportunity to serve you.  We look forward to supporting your healthcare needs in the future.    Healthy Regards,      Your Health Care Team  [facility]